# Patient Record
Sex: FEMALE | Race: WHITE | NOT HISPANIC OR LATINO | Employment: FULL TIME | URBAN - METROPOLITAN AREA
[De-identification: names, ages, dates, MRNs, and addresses within clinical notes are randomized per-mention and may not be internally consistent; named-entity substitution may affect disease eponyms.]

---

## 2023-07-07 ENCOUNTER — OFFICE VISIT (OUTPATIENT)
Dept: URGENT CARE | Facility: URGENT CARE | Age: 33
End: 2023-07-07
Payer: COMMERCIAL

## 2023-07-07 ENCOUNTER — ANCILLARY PROCEDURE (OUTPATIENT)
Dept: GENERAL RADIOLOGY | Facility: CLINIC | Age: 33
End: 2023-07-07
Attending: STUDENT IN AN ORGANIZED HEALTH CARE EDUCATION/TRAINING PROGRAM
Payer: COMMERCIAL

## 2023-07-07 VITALS
TEMPERATURE: 99 F | SYSTOLIC BLOOD PRESSURE: 147 MMHG | RESPIRATION RATE: 16 BRPM | HEART RATE: 81 BPM | OXYGEN SATURATION: 98 % | WEIGHT: 280 LBS | DIASTOLIC BLOOD PRESSURE: 92 MMHG

## 2023-07-07 DIAGNOSIS — M25.561 ACUTE PAIN OF RIGHT KNEE: Primary | ICD-10-CM

## 2023-07-07 DIAGNOSIS — M25.561 ACUTE PAIN OF RIGHT KNEE: ICD-10-CM

## 2023-07-07 PROCEDURE — 73562 X-RAY EXAM OF KNEE 3: CPT | Mod: TC | Performed by: RADIOLOGY

## 2023-07-07 PROCEDURE — 99203 OFFICE O/P NEW LOW 30 MIN: CPT | Performed by: STUDENT IN AN ORGANIZED HEALTH CARE EDUCATION/TRAINING PROGRAM

## 2023-07-07 RX ORDER — LEVOTHYROXINE SODIUM 88 UG/1
1 TABLET ORAL
COMMUNITY
Start: 2023-05-13

## 2023-07-07 NOTE — PROGRESS NOTES
"Assessment & Plan     Acute pain of right knee  Leslie is a 33 year old who presents to Urgent Care after a fall and twisting of her knee yesterday. Discussed that there is no acute fracture or dislocation of the knee and that she is likely experiencing a severe knee sprain. There also is not significant laxity of the knee on examination or effusion on right knee XR. Recommended that she schedule acetaminophen and ibuprofen for at least 1 week but that she likely will need soft tissue imaging given that she is unable to weight bear. Wrapped the knee in the office today with Ace wrap. Recommended that she follow up with her primary care provider as soon as possible for additional imaging and treatment and likely need for physical therapy but discussed that symptoms can take 4-6 weeks before seeing significant improvement. Discussed additional pain relief with cyclobenzaprine but she declines at this time and would like to utilize the ibuprofen and acetaminophen.   - XR Knee Right 3 Views     31 minutes spent by me on the date of the encounter doing chart review, history and exam, documentation and further activities per the note.    No follow-ups on file.    Lary Dewey MD  [facility] [place]    Subjective     Leslie is a 33 year old female who presents to clinic today for the following health issues:  Chief Complaint   Patient presents with     Knee Pain     Fell on the grass while playing a game and her right knee bent in, she heard a \"crack/pop\" and now she cannot bear weight on it. Pain 7/10, worse with weight.     HPI    Playing a game with her son and her friends. Fell forward, to stop herself from face planting right knee bent to the left and then heard her knee crack multiple times. Trying to make her knee ,go straight experiences a sharp pain.  Might have fallen onto the side of her knee and then rolled to her back.     MS Injury/Pain    Onset of symptoms was 1 day(s) " ago.  Location: right knee  Context:       The injury happened while at home      Mechanism: fall       Patient experienced immediate pain, delayed swelling, was able to bear weight directly after injury  Course of symptoms is worsening.    Severity moderately severe  Current and Associated symptoms: Pain, Swelling,Tenderness, Decreased range of motion and Stiffness  Denies  Bruising and Warmth  Aggravating Factors: walking, weight-bearing and movement  Therapies to improve symptoms include: ice, ibuprofen- 600 mg at around 5pm last dose, rest and elevation  This is the first time this type of problem has occurred for this patient.     Review of Systems  Constitutional, HEENT, cardiovascular, pulmonary, gi and gu systems are negative, except as otherwise noted.      Objective    BP (!) 147/92   Pulse 81   Temp 99  F (37.2  C) (Tympanic)   Resp 16   Wt 127 kg (280 lb)   SpO2 98%   Physical Exam   GENERAL: healthy, alert and no distress  MS: normal muscle tone, decreased range of motion with flexion and extension of the right knee, non pitting edema over the superior and inferior patella, peripheral pulses normal, tenderness to palpation superior to the right knee, over the medial joint line and lateral joint line, over the patella mild laxity with anterior displacement, full ROM of the ankle with dorsiflexion, inversion.   SKIN: no suspicious lesions or rashes  NEURO: Normal strength and tone, mentation intact and speech normal, full sensation to light touch bilaterally L5/S1, normal DTR.     XR Knee Right 3 Views    Result Date: 7/7/2023  KNEE THREE VIEWS RIGHT  7/7/2023 11:31 AM HISTORY: Acute pain of right knee COMPARISON: None. FINDINGS: There is no significant degenerative change. No suprapatellar effusion. There is no acute fracture. No dislocation. There are no worrisome bony lesions.     IMPRESSION:  No acute osseous abnormality demonstrated. RAJAN SUGGS MD   SYSTEM ID:  [id number]

## 2023-09-16 ENCOUNTER — HEALTH MAINTENANCE LETTER (OUTPATIENT)
Age: 33
End: 2023-09-16

## 2024-11-09 ENCOUNTER — HEALTH MAINTENANCE LETTER (OUTPATIENT)
Age: 34
End: 2024-11-09